# Patient Record
(demographics unavailable — no encounter records)

---

## 2024-12-03 NOTE — ASSESSMENT
[FreeTextEntry1] : Sinus/nasal congestion inflammation-no evidence of infection.  We discussed this is most likely residual inflammation. He is given Medrol Dosepak for 6 days. Will call/follow-up if there is no improvement or any worsening.

## 2024-12-03 NOTE — HISTORY OF PRESENT ILLNESS
[FreeTextEntry8] : Presents with persistent nasal/sinus congestion over the past few weeks.  It started initially when she was away on vacation and developed what felt like a typical head cold.  Most of the symptoms resolved but the feeling of congestion and nasally voice has persisted.  Does have some mucus production that is mostly clear.  Feels well otherwise.

## 2024-12-03 NOTE — PHYSICAL EXAM
[No Acute Distress] : no acute distress [Normal Oropharynx] : the oropharynx was normal [No Lymphadenopathy] : no lymphadenopathy

## 2025-03-03 NOTE — ASSESSMENT
[FreeTextEntry1] : Vbxzeesxvuoagy-macdue-uu lipid profile sent.  Most recent from 9/24 was 145 with an LDL of 58. For now continue rosuvastatin 10 mg daily.  Debdmhjegzphqk-itnidg-xc thyroid functions sent. Continue levothyroxine 75 mcg daily.

## 2025-03-03 NOTE — HISTORY OF PRESENT ILLNESS
[de-identified] : Presents for fasting labs and prescription renewals. She has a history of hypothyroidism and hyperlipidemia. Has remained on levothyroxine and Crestor without difficulty. Has been generally well without recent illness.

## 2025-03-03 NOTE — HEALTH RISK ASSESSMENT
[Yes] : Yes [2 - 4 times a month (2 pts)] : 2-4 times a month (2 points) [1 or 2 (0 pts)] : 1 or 2 (0 points) [Never (0 pts)] : Never (0 points) [No] : In the past 12 months have you used drugs other than those required for medical reasons? No [0] : 2) Feeling down, depressed, or hopeless: Not at all (0) [PHQ-2 Negative - No further assessment needed] : PHQ-2 Negative - No further assessment needed [Never] : Never [Audit-CScore] : 2 [[id number]] : 0